# Patient Record
Sex: MALE | Race: BLACK OR AFRICAN AMERICAN
[De-identification: names, ages, dates, MRNs, and addresses within clinical notes are randomized per-mention and may not be internally consistent; named-entity substitution may affect disease eponyms.]

---

## 2018-02-15 ENCOUNTER — HOSPITAL ENCOUNTER (EMERGENCY)
Dept: HOSPITAL 72 - EMR | Age: 48
Discharge: HOME | End: 2018-02-15
Payer: SELF-PAY

## 2018-02-15 VITALS — WEIGHT: 155 LBS | BODY MASS INDEX: 22.96 KG/M2 | HEIGHT: 69 IN

## 2018-02-15 VITALS — SYSTOLIC BLOOD PRESSURE: 138 MMHG | DIASTOLIC BLOOD PRESSURE: 85 MMHG

## 2018-02-15 DIAGNOSIS — J02.9: ICD-10-CM

## 2018-02-15 DIAGNOSIS — F17.200: ICD-10-CM

## 2018-02-15 DIAGNOSIS — J06.9: Primary | ICD-10-CM

## 2018-02-15 PROCEDURE — 99283 EMERGENCY DEPT VISIT LOW MDM: CPT

## 2018-02-15 NOTE — EMERGENCY ROOM REPORT
History of Present Illness


General


Chief Complaint:  Upper Respiratory Illness


Source:  Patient





Present Illness


HPI


Is a 48-year-old male with positive and past medical history.  He presents with 

chief complaint of sore throat and cough and congestion about a week.  His 

niece and nephew was just tested positive for strep throat.  Denies any other 

complaint.  Pain is 5/10.  Worse with swallowing.


Allergies:  


Coded Allergies:  


     No Known Allergies (Unverified , 2/15/18)





Patient History


Past Medical History:  see triage record, old chart reviewed


Past Surgical History:  none


Pertinent Family History:  none


Social History:  Reports: smoking


Immunizations:  other


Reviewed Nursing Documentation:  PMH: Agreed, PSxH: Agreed





Nursing Documentation-PMH


Past Medical History:  No Stated History





Review of Systems


Eye:  Denies: eye pain, blurred vision


ENT:  Reports: nose congestion, Denies: ear pain, throat swelling


Respiratory:  Reports: cough, Denies: shortness of breath


Cardiovascular:  Denies: chest pain, palpitations


Gastrointestinal:  Denies: abdominal pain, diarrhea, nausea, vomiting


Musculoskeletal:  Denies: back pain, joint pain


Skin:  Denies: rash


Neurological:  Denies: headache, numbness


Endocrine:  Denies: increased thirst, increased urine


Hematologic/Lymphatic:  Denies: easy bruising


All Other Systems:  negative except mentioned in HPI





Physical Exam





Vital Signs








  Date Time  Temp Pulse Resp B/P (MAP) Pulse Ox O2 Delivery O2 Flow Rate FiO2


 


2/15/18 01:31 98.8 88 14 138/85 95 Room Air  





 98.8       





vitals normal


Sp02 EP Interpretation:  reviewed, normal


General Appearance:  well appearing, no apparent distress, alert


Head:  normocephalic, atraumatic


Eyes:  bilateral eye PERRL, bilateral eye EOMI


ENT:  hearing grossly normal, normal pharynx


Neck:  full range of motion, supple, no meningismus


Respiratory:  chest non-tender, lungs clear, normal breath sounds


Cardiovascular #1:  regular rate, rhythm, no murmur


Gastrointestinal:  normal bowel sounds, non tender, no mass, no organomegaly, 

no bruit, non-distended


Musculoskeletal:  back normal, gait/station normal, normal range of motion


Psychiatric:  mood/affect normal


Skin:  warm/dry





Medical Decision Making


Diagnostic Impression:  


 Primary Impression:  


 Upper respiratory infection


 Qualified Codes:  J06.9 - Acute upper respiratory infection, unspecified


 Additional Impression:  


 Pharyngitis


 Qualified Codes:  J02.9 - Acute pharyngitis, unspecified


ER Course


Patient presents clinically with a viral illness.  Because the niece and nephew 

were tested positive for strep, we'll put him on antibiotics.  Explained to him 

if symptoms don't improve in 2 days and most likely this is viral.  No evidence 

of peritonsillar abscess or retropharyngeal abscess.  We'll discharge home.





Last Vital Signs








  Date Time  Temp Pulse Resp B/P (MAP) Pulse Ox O2 Delivery O2 Flow Rate FiO2


 


2/15/18 01:31 98.8 88 14 138/85 95 Room Air  





 98.8       








Status:  improved


Disposition:  HOME, SELF-CARE


Condition:  Stable


Scripts


Azithromycin* (ZITHROMAX*) 250 Mg Tablet


250 MG ORAL DAILY, #6 TAB 0 Refills


   Take two tablets by mouth today, then take one tablet by mouth daily


   for four days


   Prov: AMALIA YAN M.D.         2/15/18


Patient Instructions:  Upper Respiratory Infection, Adult





Additional Instructions:  


Followup with your Dr. in 7 days.  Return if worse.











AMALIA YAN M.D. Feb 15, 2018 02:05